# Patient Record
Sex: FEMALE | Race: WHITE
[De-identification: names, ages, dates, MRNs, and addresses within clinical notes are randomized per-mention and may not be internally consistent; named-entity substitution may affect disease eponyms.]

---

## 2018-04-17 ENCOUNTER — HOSPITAL ENCOUNTER (EMERGENCY)
Dept: HOSPITAL 45 - C.EDB | Age: 83
Discharge: HOME | End: 2018-04-17
Payer: COMMERCIAL

## 2018-04-17 VITALS
BODY MASS INDEX: 29.86 KG/M2 | HEIGHT: 65 IN | WEIGHT: 179.24 LBS | HEIGHT: 65 IN | WEIGHT: 179.24 LBS | BODY MASS INDEX: 29.86 KG/M2

## 2018-04-17 VITALS — OXYGEN SATURATION: 94 %

## 2018-04-17 VITALS — HEART RATE: 54 BPM | OXYGEN SATURATION: 94 % | DIASTOLIC BLOOD PRESSURE: 71 MMHG | SYSTOLIC BLOOD PRESSURE: 157 MMHG

## 2018-04-17 VITALS — TEMPERATURE: 98.06 F

## 2018-04-17 DIAGNOSIS — M54.42: Primary | ICD-10-CM

## 2018-04-17 DIAGNOSIS — M54.41: ICD-10-CM

## 2018-04-17 DIAGNOSIS — Z88.5: ICD-10-CM

## 2018-04-17 DIAGNOSIS — Z88.8: ICD-10-CM

## 2018-04-17 DIAGNOSIS — Z88.0: ICD-10-CM

## 2018-04-17 DIAGNOSIS — E11.9: ICD-10-CM

## 2018-04-17 LAB
ALBUMIN SERPL-MCNC: 4 GM/DL (ref 3.4–5)
ALP SERPL-CCNC: 63 U/L (ref 45–117)
ALT SERPL-CCNC: 23 U/L (ref 12–78)
AST SERPL-CCNC: 15 U/L (ref 15–37)
BASOPHILS # BLD: 0.05 K/UL (ref 0–0.2)
BASOPHILS NFR BLD: 0.5 %
BUN SERPL-MCNC: 19 MG/DL (ref 7–18)
CALCIUM SERPL-MCNC: 9.5 MG/DL (ref 8.5–10.1)
CO2 SERPL-SCNC: 28 MMOL/L (ref 21–32)
CREAT SERPL-MCNC: 0.92 MG/DL (ref 0.6–1.2)
EOS ABS #: 0.17 K/UL (ref 0–0.5)
EOSINOPHIL NFR BLD AUTO: 209 K/UL (ref 130–400)
GLUCOSE SERPL-MCNC: 171 MG/DL (ref 70–99)
HCT VFR BLD CALC: 41.4 % (ref 37–47)
HGB BLD-MCNC: 14.6 G/DL (ref 12–16)
IG#: 0.02 K/UL (ref 0–0.02)
IMM GRANULOCYTES NFR BLD AUTO: 24.3 %
LYMPHOCYTES # BLD: 2.43 K/UL (ref 1.2–3.4)
MCH RBC QN AUTO: 31.5 PG (ref 25–34)
MCHC RBC AUTO-ENTMCNC: 35.3 G/DL (ref 32–36)
MCV RBC AUTO: 89.2 FL (ref 80–100)
MONO ABS #: 0.55 K/UL (ref 0.11–0.59)
MONOCYTES NFR BLD: 5.5 %
NEUT ABS #: 6.79 K/UL (ref 1.4–6.5)
NEUTROPHILS # BLD AUTO: 1.7 %
NEUTROPHILS NFR BLD AUTO: 67.8 %
PMV BLD AUTO: 9.8 FL (ref 7.4–10.4)
POTASSIUM SERPL-SCNC: 3.9 MMOL/L (ref 3.5–5.1)
PROT SERPL-MCNC: 7.3 GM/DL (ref 6.4–8.2)
RED CELL DISTRIBUTION WIDTH CV: 13.9 % (ref 11.5–14.5)
RED CELL DISTRIBUTION WIDTH SD: 45.5 FL (ref 36.4–46.3)
SODIUM SERPL-SCNC: 137 MMOL/L (ref 136–145)
WBC # BLD AUTO: 10.01 K/UL (ref 4.8–10.8)

## 2018-04-17 NOTE — EMERGENCY ROOM VISIT NOTE
History


Report prepared by Clemencia:  Jose Guadalupe Fuentes


Under the Supervision of:  Dr. Pascual Nguyen M.D.


First contact with patient:  14:16


Chief Complaint:  BACK PAIN


Stated Complaint:  SEVERE PAIN IN BACK GOING DOWN BOTH LEGS





History of Present Illness


The patient is an 85 year old female who presents to the Emergency Room with 

complaints of constant back pain that started 3 days ago. She states that she 

was pulling heavy furniture 5 days ago, and was fine 4 days ago, but she woke 

up 3 days ago with "horrible pain" in her low back, buttocks, and down both 

legs. The patient notes that she has neuropathy, so she has chronic numbness in 

her legs from the knees-down, but over the past couple days, her legs have "

felt really heavy". She says that she has been ambulating fine, but today her 

legs felt like they "would give out". The patient says that she took 2 Aleve 4 

hours ago, but it only worked for an hour or so. The patient adds that she took 

1 Ultram before the ride here, which she has for her neuropathy. She denies any 

abdominal pain, or loss of control of her bladder or bowels. The patient says 

that she had back surgery done 13 years ago, having 2 rods and 4 screws 

inserted by Dr. Anderson. She adds that the pain is right below the incision 

site. The patient states that she takes 81 mg Aspirin daily but no other blood 

thinners.





   Source of History:  patient, family


   Onset:  3 days ago


   Position:  back (lower)


   Symptom Intensity:  "horrible"


   Quality:  other (pain)


   Timing:  constant


   Associated Symptoms:  + weakness (felt like legs going to give out), No 

abdominal pain, No urinary symptoms (loss of bowel control or bladder control)


Note:


Associated symptoms: Legs feel heavy. Buttock pain, pain down both legs.





Review of Systems


See HPI for pertinent positives & negatives. A total of 10 systems reviewed and 

were otherwise negative.





Past Medical & Surgical


Medical Problems:


(1) Diabetes


Surgical Problems:


(1) Previous back surgery








Family History


Family history omitted secondary to patient's advanced age.





Social History


Smoking Status:  Never Smoker


Drug Use:  none


Marital Status:  


Occupation Status:  retired





Current/Historical Medications


Scheduled


Docusate Sodium (Colace), 1 CAP PO BID


Lidocaine (Lidocaine), 5 % TD DAILY


Senna (Senokot), 1 TAB PO HS





Scheduled PRN


Hydrocodone/Acetaminophen 5MG/325MG (Norco 5MG/325MG), 2 TABLETS PO Q6 PRN for 

Pain





Allergies


Coded Allergies:  


     Aspirin (Verified  Allergy, Unknown, 2/5/10)


     Clarithromycin (Verified  Allergy, Unknown, 2/5/10)


     Meperidine (Verified  Allergy, Unknown, 2/5/10)


     Oxycodone (Verified  Allergy, Unknown, 2/5/10)


     Penicillins (Verified  Allergy, Unknown, 2/5/10)





Physical Exam


Vital Signs











  Date Time  Temp Pulse Resp B/P (MAP) Pulse Ox O2 Delivery O2 Flow Rate FiO2


 


4/17/18 16:54  54 18 157/71 94   


 


4/17/18 16:13  53 13 178/77  Room Air  


 


4/17/18 15:13  61      


 


4/17/18 14:46     94 Room Air  


 


4/17/18 14:46     94 Room Air  


 


4/17/18 14:09 36.7 62 18 198/81 94 Room Air  











Physical Exam


GENERAL: Awake, alert, well-appearing, in no acute distress


HENT: Normocephalic, atraumatic. Oropharynx unremarkable.


EYES: Normal conjunctiva. Sclera non-icteric.


NECK: Supple. No nuchal rigidity. FROM. No JVD.


RESPIRATORY: Clear to auscultation.


CARDIAC: Regular rate, normal rhythm. Extremities warm and well perfused. 

Pulses equal.


ABDOMEN: Soft, non-distended. No tenderness to palpation. No rebound or 

guarding. No masses.


RECTAL: Deferred.


MUSCULOSKELETAL: Chest examination reveals no tenderness. The back is 

symmetrical on inspection without obvious abnormality. There is no CVA 

tenderness to palpation. No joint edema. 


LOWER EXTREMITIES: Calves are equal size bilaterally and non-tender. No edema. 

No discoloration. 


NEURO: No evidence of saddle anesthesia. Able to walk on tippy-toes and heels. 

No loss of bowel or bladder control. Normal sensorium. No sensory or motor 

deficits noted. 


SKIN: No rash or jaundice noted.





Medical Decision & Procedures


ER Provider


Diagnostic Interpretation:


Radiology results as stated below per my review and radiologist interpretation:








CT PELVIS NO IV/ORAL CONT (CT)





CT DOSE:    





CLINICAL HISTORY: Pelvic and hip pain status post trauma    





TECHNIQUE: Helical images were acquired in the transverse plane. Coronal


reformatted images were acquired.  A dose lowering technique was utilized


adhering to the principles of ALARA.








COMPARISON STUDY:  None.





FINDINGS: There are postsurgical changes at the L4-5 level.





There is colonic diverticulosis. There are no acute peridiverticular


inflammatory changes.





The uterus is surgically absent.





There is a small fat-containing right inguinal hernia.





There is no evidence of pathologic adenopathy.





No acute fractures are visualized.





The bones are osteopenic.





There is no SI joint diastases. There is no symphysis diastases.





There are mild osteoarthritic changes present within each hip.





There are a few scattered sclerotic densities, statistically representing bone


islands.





IMPRESSION:  


1. Mild degenerative changes within the hips


2. No acute fractures. 








Electronically signed by:  Delfino Marcelino M.D.


4/17/2018 4:02 PM





Dictated Date/Time:  4/17/2018 4:00 PM











CT LUMBAR SPINE WITHOUT





CT DOSE: 1303.54 mGycm





CLINICAL HISTORY: Lower back pain with bilateral leg radiculopathy.    





TECHNIQUE: Helical images were acquired in transverse plane. Reformatted


sagittal and coronal images were reviewed.  A dose lowering technique was


utilized adhering to the principles of ALARA.








CONTRAST: No contrast was administered





COMPARISON STUDY: None.





FINDINGS: 


L1-2 level: There is no evidence of significant disc bulge or focal herniation.


There is no evidence of spinal or foraminal stenosis.


L2-3 level: There is marked disc desiccation. There is a circumferential disc


bulge. There is severe spinal stenosis. There is no significant foraminal


narrowing.


L3-4 level: There is a grade 1 spondylolisthesis. There is a circumferential


disc bulge. There is severe spinal stenosis. There is no significant foraminal


narrowing.


L4-5 level: There are postsurgical changes of discectomy and interbody fusion.


There is a posterior laminectomy defect. There is bilateral pedicle screw


fixation. There is no significant spinal or foraminal stenosis.


L5-S1 level: There is minimal retrolisthesis of L5 on S1. There is a mild


circumferential disc bulge. There is mild spinal canal narrowing.





No acute fractures or traumatic subluxations are visualized.





IMPRESSION: 


1. No acute fractures or traumatic subluxations identified


2. Postsurgical changes at the L4-5 level


3. Advanced multilevel spondylitic changes with severe spinal stenosis at the


L2-3 and L3-4 levels. 








Electronically signed by:  Delfino Marcelino M.D.


4/17/2018 3:58 PM





Dictated Date/Time:  4/17/2018 3:53 PM











Laboratory Results


4/17/18 14:55








Red Blood Count 4.64, Mean Corpuscular Volume 89.2, Mean Corpuscular Hemoglobin 

31.5, Mean Corpuscular Hemoglobin Concent 35.3, Mean Platelet Volume 9.8, 

Neutrophils (%) (Auto) 67.8, Lymphocytes (%) (Auto) 24.3, Monocytes (%) (Auto) 

5.5, Eosinophils (%) (Auto) 1.7, Basophils (%) (Auto) 0.5, Neutrophils # (Auto) 

6.79, Lymphocytes # (Auto) 2.43, Monocytes # (Auto) 0.55, Eosinophils # (Auto) 

0.17, Basophils # (Auto) 0.05





4/17/18 14:55

















Test


  4/17/18


14:35 4/17/18


14:55


 


Urine Color YELLOW  


 


Urine Appearance CLEAR (CLEAR)  


 


Urine pH


  >= 9.0


(4.5-7.5) 


 


 


Urine Specific Gravity


  1.012


(1.000-1.030) 


 


 


Urine Protein NEG (NEG)  


 


Urine Glucose (UA) NEG (NEG)  


 


Urine Ketones NEG (NEG)  


 


Urine Occult Blood NEG (NEG)  


 


Urine Nitrite NEG (NEG)  


 


Urine Bilirubin NEG (NEG)  


 


Urine Urobilinogen NEG (NEG)  


 


Urine Leukocyte Esterase LARGE (NEG)  


 


Urine WBC (Auto) >30 /hpf (0-5)  


 


Urine RBC (Auto) 0-4 /hpf (0-4)  


 


Urine Hyaline Casts (Auto) 0 /lpf (0-5)  


 


Urine Epithelial Cells (Auto)


  10-20 /lpf


(0-5) 


 


 


Urine Bacteria (Auto) NEG (NEG)  


 


White Blood Count


  


  10.01 K/uL


(4.8-10.8)


 


Red Blood Count


  


  4.64 M/uL


(4.2-5.4)


 


Hemoglobin


  


  14.6 g/dL


(12.0-16.0)


 


Hematocrit  41.4 % (37-47) 


 


Mean Corpuscular Volume


  


  89.2 fL


()


 


Mean Corpuscular Hemoglobin


  


  31.5 pg


(25-34)


 


Mean Corpuscular Hemoglobin


Concent 


  35.3 g/dl


(32-36)


 


Platelet Count


  


  209 K/uL


(130-400)


 


Mean Platelet Volume


  


  9.8 fL


(7.4-10.4)


 


Neutrophils (%) (Auto)  67.8 % 


 


Lymphocytes (%) (Auto)  24.3 % 


 


Monocytes (%) (Auto)  5.5 % 


 


Eosinophils (%) (Auto)  1.7 % 


 


Basophils (%) (Auto)  0.5 % 


 


Neutrophils # (Auto)


  


  6.79 K/uL


(1.4-6.5)


 


Lymphocytes # (Auto)


  


  2.43 K/uL


(1.2-3.4)


 


Monocytes # (Auto)


  


  0.55 K/uL


(0.11-0.59)


 


Eosinophils # (Auto)


  


  0.17 K/uL


(0-0.5)


 


Basophils # (Auto)


  


  0.05 K/uL


(0-0.2)


 


RDW Standard Deviation


  


  45.5 fL


(36.4-46.3)


 


RDW Coefficient of Variation


  


  13.9 %


(11.5-14.5)


 


Immature Granulocyte % (Auto)  0.2 % 


 


Immature Granulocyte # (Auto)


  


  0.02 K/uL


(0.00-0.02)


 


Anion Gap


  


  7.0 mmol/L


(3-11)


 


Est Creatinine Clear Calc


Drug Dose 


  47.1 ml/min 


 


 


Estimated GFR (


American) 


  65.8 


 


 


Estimated GFR (Non-


American 


  56.8 


 


 


BUN/Creatinine Ratio  20.9 (10-20) 


 


Calcium Level


  


  9.5 mg/dl


(8.5-10.1)


 


Total Bilirubin


  


  0.4 mg/dl


(0.2-1)


 


Direct Bilirubin


  


  < 0.1 mg/dl


(0-0.2)


 


Aspartate Amino Transf


(AST/SGOT) 


  15 U/L (15-37) 


 


 


Alanine Aminotransferase


(ALT/SGPT) 


  23 U/L (12-78) 


 


 


Alkaline Phosphatase


  


  63 U/L


()


 


Total Protein


  


  7.3 gm/dl


(6.4-8.2)


 


Albumin


  


  4.0 gm/dl


(3.4-5.0)


 


Thyroid Stimulating Hormone


(TSH) 


  0.472 uIu/ml


(0.300-4.500)


 


Digoxin Level


  


  1.5 ng/ml


(0.8-2.0)





Labs reviewed by ED physician.





Medications Administered











 Medications


  (Trade)  Dose


 Ordered  Sig/Graham


 Route  Start Time


 Stop Time Status Last Admin


Dose Admin


 


 Hydromorphone HCl


  (Dilaudid Inj)  0.25 mg  NOW  STAT


 IV  4/17/18 14:27


 4/17/18 14:31 DC 4/17/18 15:16


0.25 MG


 


 Ondansetron HCl


  (Zofran Inj)  4 mg  NOW  STAT


 IV  4/17/18 14:27


 4/17/18 14:31 DC 4/17/18 15:16


4 MG


 


 Lidocaine


  (Lidoderm Patch


 5%)  1 patch  NOW  STAT


 TD  4/17/18 14:27


 4/17/18 14:31 DC 4/17/18 15:16


1 PATCH


 


 Acetaminophen


  (Tylenol Tab)  1,000 mg  NOW  STAT


 PO  4/17/18 14:27


 4/17/18 14:31 DC 4/17/18 15:17


1,000 MG


 


 Acetaminophen/


 Hydrocodone Bitart


  (Norco 5/325mg


 Home Pack)  1 homepack  UD  STAT


 PO  4/17/18 16:32


 4/17/18 16:33 DC 4/17/18 16:44


1 HOMEPACK











ECG Per My Interpretation


Indication:  weakness


Rate (beats per minute):  62


Rhythm:  sinus rhythm


Findings:  LBBB, other (wide QRS)


Comparison ECG Date:  compared to Jan 6 2005, LBBB is new





ED Course


1418: Past medical records reviewed. The patient was evaluated in room C4. A 

complete history and physical examination was performed. 





1427: Tylenol Tab 1000 mg PO, Lidoderm Patch 5% 1 patch TD, Zofran Inj 4 mg IV, 

Dilaudid Inj 0.25 mg IV.





1518: I reevaluated the patient and she is doing better.





1600: Upon reexamination the patient was walked and is ambulating fine. I 

discussed results and treatment plan with the patient. She verbalizes agreement 

and understanding. The patient is ready for discharge.





1622: Norco 5/325 mg Home Pack 1 homepack PO.





Medical Decision


Differential diagnosis:


Etiologies such as musculoskeletal, disc herniation, fracture, aortic disease, 

metastatic disease, cord compression, discitis, infection, renal colic, 

gastrointestinal, acute exacerbation of chronic back pain, sciatica, cauda 

equina, as well as others were entertained.








This is an 85-year-old female who presents the emergency department complaining 

of low back pain.  The patient was ambulated by myself and was able to walk on 

her tiptoes and her heels.  She has no loss of bowel or bladder control.  She 

was sent for CAT scan of the pelvis as well as the low back.  This did not show 

any acute process.  The patient was given Zofran as well as Dilaudid here in 

the emergency department.  Repeat examination revealed improvement the patient'

s symptoms.  The patient was then ambulated again by me and did not have any 

difficulty walking.  I strongly recommended to the patient that she use a 

walker at home.  I also offered her inpatient rehabilitation however she and 

her family declined.  The patient will  follow up with outpatient [facility].  

She was seen by case management.  Patient was in agreement with the treatment 

plan.





Medication Reconcilliation


Current Medication List:  was personally reviewed by me





Blood Pressure Screening


Patient's blood pressure:  Elevated blood pressure


Blood pressure disposition:  Elevated BP felt to be situational





Impression





 Primary Impression:  


 Back pain





Scribe Attestation


The scribe's documentation has been prepared under my direction and personally 

reviewed by me in its entirety. I confirm that the note above accurately 

reflects all work, treatment, procedures, and medical decision making performed 

by me.





Departure Information


Dispostion


Home / Self-Care





Prescriptions





Docusate Sodium (COLACE) 100 Mg Cap


1 CAP PO BID for 30 Days, #60 CAP


   Prov: Pascual Nguyen MD         4/17/18 


Senna (Senokot) 8.6 Mg Tab


1 TAB PO HS for 30 Days, #30 TAB


   Prov: Pascual Nguyen MD         4/17/18 


Lidocaine (Lidocaine) 1 Patch Tdsy


5 % TD DAILY for 30 Days, #30 PATCH


   Prov: Pascual Nguyen MD         4/17/18 


Hydrocodone/Acetaminophen 5MG/325MG (Norco 5MG/325MG)  Tab


2 TABLETS PO Q6 Y for Pain, #14 TAB


   Prov: Pascual Nguyen MD         4/17/18





Referrals


Jluis Smith PA-C (PCP)





Forms


HOME CARE DOCUMENTATION FORM,                                                 

               IMPORTANT VISIT INFORMATION, School Instructions,       


   Work Instructions





Patient Instructions


Back Pain - [facility], Back Pain Relieve, ED Low Back Pain Injury, ED Neck Back Pain 

General, Leg Low Back Pain Poss Causes, Low Back Pain Self Care, [facility]





Additional Instructions





Follow up with Dr Anderson's office and [facility] for breakthrough pain





Problem Qualifiers








 Primary Impression:  


 Back pain


 Back pain location:  low back pain  Chronicity:  acute  Back pain laterality:  

bilateral  Sciatica presence:  with sciatica  Sciatica laterality:  bilateral 

sciatica  Qualified Codes:  M54.42 - Lumbago with sciatica, left side; M54.41 - 

Lumbago with sciatica, right side

## 2018-04-17 NOTE — DIAGNOSTIC IMAGING REPORT
CT PELVIS NO IV/ORAL CONT (CT)



CT DOSE:    



CLINICAL HISTORY: Pelvic and hip pain status post trauma    



TECHNIQUE: Helical images were acquired in the transverse plane. Coronal

reformatted images were acquired.  A dose lowering technique was utilized

adhering to the principles of ALARA.





COMPARISON STUDY:  None.



FINDINGS: There are postsurgical changes at the L4-5 level.



There is colonic diverticulosis. There are no acute peridiverticular

inflammatory changes.



The uterus is surgically absent.



There is a small fat-containing right inguinal hernia.



There is no evidence of pathologic adenopathy.



No acute fractures are visualized.



The bones are osteopenic.



There is no SI joint diastases. There is no symphysis diastases.



There are mild osteoarthritic changes present within each hip.



There are a few scattered sclerotic densities, statistically representing bone

islands.



IMPRESSION:  

1. Mild degenerative changes within the hips

2. No acute fractures. 







Electronically signed by:  Delfino Marcelino M.D.

4/17/2018 4:02 PM



Dictated Date/Time:  4/17/2018 4:00 PM

## 2018-04-17 NOTE — DIAGNOSTIC IMAGING REPORT
CT LUMBAR SPINE WITHOUT



CT DOSE: 1303.54 mGycm



CLINICAL HISTORY: Lower back pain with bilateral leg radiculopathy.    



TECHNIQUE: Helical images were acquired in transverse plane. Reformatted

sagittal and coronal images were reviewed.  A dose lowering technique was

utilized adhering to the principles of ALARA.





CONTRAST: No contrast was administered



COMPARISON STUDY: None.



FINDINGS: 

L1-2 level: There is no evidence of significant disc bulge or focal herniation.

There is no evidence of spinal or foraminal stenosis.

L2-3 level: There is marked disc desiccation. There is a circumferential disc

bulge. There is severe spinal stenosis. There is no significant foraminal

narrowing.

L3-4 level: There is a grade 1 spondylolisthesis. There is a circumferential

disc bulge. There is severe spinal stenosis. There is no significant foraminal

narrowing.

L4-5 level: There are postsurgical changes of discectomy and interbody fusion.

There is a posterior laminectomy defect. There is bilateral pedicle screw

fixation. There is no significant spinal or foraminal stenosis.

L5-S1 level: There is minimal retrolisthesis of L5 on S1. There is a mild

circumferential disc bulge. There is mild spinal canal narrowing.



No acute fractures or traumatic subluxations are visualized.



IMPRESSION: 

1. No acute fractures or traumatic subluxations identified

2. Postsurgical changes at the L4-5 level

3. Advanced multilevel spondylitic changes with severe spinal stenosis at the

L2-3 and L3-4 levels. 







Electronically signed by:  Delfino Marcelino M.D.

4/17/2018 3:58 PM



Dictated Date/Time:  4/17/2018 3:53 PM

## 2018-04-19 NOTE — PHARMACY PROGRESS NOTE
ED Pharmacist Culture FollowUp


Date of Service:


Apr 19, 2018.





E. coli isolated from urine culture.  Isolation does not always indicate 

infection.





CFU less than 100,000/mL, no urinary symptoms documented, WBC and temp wnl, UA 

not grossly abnormal and has epithelial cell contamination.





No intervention required.  Case discussed w Dr. Carter.